# Patient Record
Sex: MALE | Race: WHITE | NOT HISPANIC OR LATINO | Employment: FULL TIME | URBAN - METROPOLITAN AREA
[De-identification: names, ages, dates, MRNs, and addresses within clinical notes are randomized per-mention and may not be internally consistent; named-entity substitution may affect disease eponyms.]

---

## 2019-09-24 ENCOUNTER — OFFICE VISIT (OUTPATIENT)
Dept: GASTROENTEROLOGY | Facility: CLINIC | Age: 64
End: 2019-09-24

## 2019-09-24 VITALS
DIASTOLIC BLOOD PRESSURE: 90 MMHG | SYSTOLIC BLOOD PRESSURE: 146 MMHG | BODY MASS INDEX: 30.16 KG/M2 | WEIGHT: 199 LBS | HEIGHT: 68 IN | TEMPERATURE: 98.2 F

## 2019-09-24 DIAGNOSIS — K59.1 FUNCTIONAL DIARRHEA: Primary | ICD-10-CM

## 2019-09-24 DIAGNOSIS — R14.0 ABDOMINAL BLOATING: ICD-10-CM

## 2019-09-24 DIAGNOSIS — R10.9 ABDOMINAL CRAMPING: ICD-10-CM

## 2019-09-24 PROCEDURE — 99204 OFFICE O/P NEW MOD 45 MIN: CPT | Performed by: INTERNAL MEDICINE

## 2019-09-24 RX ORDER — DIPHENHYDRAMINE HCL 25 MG
25 TABLET ORAL EVERY 6 HOURS PRN
COMMUNITY

## 2019-09-24 RX ORDER — HYOSCYAMINE SULFATE 0.125 MG
0.12 TABLET ORAL EVERY 6 HOURS PRN
Qty: 90 TABLET | Refills: 1 | Status: SHIPPED | OUTPATIENT
Start: 2019-09-24 | End: 2019-12-10 | Stop reason: SDUPTHER

## 2019-09-24 RX ORDER — ATORVASTATIN CALCIUM 80 MG/1
80 TABLET, FILM COATED ORAL DAILY
COMMUNITY

## 2019-09-24 NOTE — PROGRESS NOTES
Chief Complaint   Patient presents with   • Diarrhea   • Bloated   • Abdominal Cramping       Subjective     HPI    Chance Pepper is a 64 y.o. male with a past medical history noted below who presents for evaluation of diarrhea, bloating, and abdominal pain.  Symptoms have been present for years with worsening since January.  No associated weight loss.  At his baseline he describes 4-5 bowel movements daily.  This is increased to about 6-7 and a day.  He describes that they are spread out throughout the day with about half being in the morning and half being later on in the day.  There is associated abdominal bloating that gets worse as the day goes on.  He does have associated abdominal cramping.  He does take pepto occasionally which helps a little bit.    Bright red blood with wiping on occasion.    No family hx of GI malignancies.      He has had a hernia repair.      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    Last colonoscopy was about 3.5 years ago-- he thinks this was with Dr Gibson-- reportedly normal.  He had an EGD and was diagnosed with H pylori.  He completed treatment and reports a test showing cure.  He does not think that random colon biopsies were done at that time due to him not having his diarrhea symptoms at that time.      Past Medical History:   Diagnosis Date   • Heart disease          Current Outpatient Medications:   •  aspirin 81 MG EC tablet, Take 81 mg by mouth daily., Disp: , Rfl:   •  atorvastatin (LIPITOR) 80 MG tablet, Take 80 mg by mouth Daily., Disp: , Rfl:   •  Coenzyme Q10 (COQ10 PO), Take 1 capsule by mouth Daily., Disp: , Rfl:   •  diphenhydrAMINE (BENADRYL) 25 MG tablet, Take 25 mg by mouth Every 6 (Six) Hours As Needed for Itching., Disp: , Rfl:   •  fluticasone (FLONASE) 50 MCG/ACT nasal spray, 2 sprays into each nostril daily. Administer 2 sprays in each nostril for each dose., Disp: , Rfl:   •  metoprolol tartrate (LOPRESSOR) 25 MG tablet, Take 25 mg by mouth  2 (two) times a day., Disp: , Rfl:   •  nitroglycerin (NITROSTAT) 0.4 MG SL tablet, Place 0.4 mg under the tongue every 5 (five) minutes as needed for chest pain. Take no more than 3 doses in 15 minutes., Disp: , Rfl:   •  tadalafil (CIALIS) 5 MG tablet, Take 5 mg by mouth daily as needed for erectile dysfunction., Disp: , Rfl:   •  zolpidem (AMBIEN) 10 MG tablet, Take 10 mg by mouth at night as needed for sleep., Disp: , Rfl:   •  hyoscyamine (ANASPAZ,LEVSIN) 0.125 MG tablet, Take 1 tablet by mouth Every 6 (Six) Hours As Needed for Cramping or Diarrhea., Disp: 90 tablet, Rfl: 1  •  pantoprazole (PROTONIX) 20 MG EC tablet, Take 20 mg by mouth daily., Disp: , Rfl:   •  rosuvastatin (CRESTOR) 20 MG tablet, Take 20 mg by mouth daily., Disp: , Rfl:     Allergies   Allergen Reactions   • Amoxicillin    • Clarithromycin    • Hydrocodone    • Lortab [Hydrocodone-Acetaminophen]        Social History     Socioeconomic History   • Marital status: Unknown     Spouse name: Not on file   • Number of children: Not on file   • Years of education: Not on file   • Highest education level: Not on file   Tobacco Use   • Smoking status: Current Some Day Smoker     Years: 2.00     Types: Pipe   • Smokeless tobacco: Never Used   Substance and Sexual Activity   • Alcohol use: Yes     Comment: 2 DRINKS PER WEEK   • Drug use: No   • Sexual activity: Defer       Family History   Problem Relation Age of Onset   • Heart disease Father    • Cancer Sister         breast   • COPD Sister        Review of Systems   Constitutional: Negative for activity change, appetite change and fatigue.   HENT: Negative for sore throat and trouble swallowing.    Respiratory: Negative.    Cardiovascular: Negative.    Gastrointestinal: Positive for abdominal distention and diarrhea. Negative for abdominal pain and blood in stool.        +cramping   Endocrine: Negative for cold intolerance and heat intolerance.   Genitourinary: Negative for difficulty urinating,  dysuria and frequency.   Musculoskeletal: Negative for arthralgias, back pain and myalgias.   Skin: Negative.    Hematological: Negative for adenopathy. Does not bruise/bleed easily.   All other systems reviewed and are negative.      Objective     Vitals:    09/24/19 1336   BP: 146/90   Temp: 98.2 °F (36.8 °C)         09/24/19  1336   Weight: 90.3 kg (199 lb)     Body mass index is 30.26 kg/m².    Physical Exam   Constitutional: He is oriented to person, place, and time. He appears well-developed and well-nourished. No distress.   HENT:   Head: Normocephalic and atraumatic.   Right Ear: External ear normal.   Left Ear: External ear normal.   Nose: Nose normal.   Mouth/Throat: Oropharynx is clear and moist.   Eyes: Conjunctivae and EOM are normal. Right eye exhibits no discharge. Left eye exhibits no discharge. No scleral icterus.   Neck: Normal range of motion. Neck supple. No thyromegaly present.   No supraclavicular adenopathy   Cardiovascular: Normal rate, regular rhythm, normal heart sounds and intact distal pulses. Exam reveals no gallop.   No murmur heard.  No lower extremity edema   Pulmonary/Chest: Effort normal and breath sounds normal. No respiratory distress. He has no wheezes.   Abdominal: Soft. Normal appearance and bowel sounds are normal. He exhibits no distension and no mass. There is no hepatosplenomegaly. There is no tenderness. There is no rigidity, no rebound and no guarding. No hernia.   Genitourinary:   Genitourinary Comments: Rectal exam deferred   Musculoskeletal: Normal range of motion. He exhibits no edema or tenderness.   No atrophy of upper or lower extremities.  Normal digits and nails of both hands.   Lymphadenopathy:     He has no cervical adenopathy.   Neurological: He is alert and oriented to person, place, and time. He displays no atrophy. Coordination normal.   Skin: Skin is warm and dry. No rash noted. He is not diaphoretic. No erythema.   Psychiatric: He has a normal mood and  affect. His behavior is normal. Judgment and thought content normal.   Vitals reviewed.      No results found for: WBC, RBC, HGB, HCT, MCV, MCH, MCHC, RDW, RDWSD, MPV, PLT, NEUTRORELPCT, LYMPHORELPCT, MONORELPCT, EOSRELPCT, BASORELPCT, AUTOIGPER, NEUTROABS, LYMPHSABS, MONOSABS, EOSABS, BASOSABS, AUTOIGNUM, NRBC    No results found for: GLUCOSE, NA, K, CO2, CL, ANIONGAP, CREATININE, BUN, BCR, CALCIUM, EGFRIFNONA, ALKPHOS, PROTEINTOT, ALT, AST, BILITOT, ALBUMIN, GLOB, LABIL2      Imaging Results (last 7 days)     ** No results found for the last 168 hours. **            No notes on file    Assessment/Plan    Diarrhea: Chronic issue with worsening over the past 8 to 9 months.?  IBS versus medication versus other    Abdominal cramping: With above    Abdominal bloating: With above    Plan  We will check celiac labs today  Trial of hyoscyamine for symptoms  We will get records from his past colonoscopy and EGD from Dr. Gibson's office  We will get his recent labs from his primary care physician  If not improving, then will look to repeat colonoscopy with random colon biopsies    Chance was seen today for diarrhea, bloated and abdominal cramping.    Diagnoses and all orders for this visit:    Functional diarrhea  -     IgA  -     Tissue Transglutaminase, IgA  -     Tissue Transglutaminase, IgG    Abdominal cramping  -     IgA  -     Tissue Transglutaminase, IgA  -     Tissue Transglutaminase, IgG    Abdominal bloating  -     IgA  -     Tissue Transglutaminase, IgA  -     Tissue Transglutaminase, IgG    Other orders  -     hyoscyamine (ANASPAZ,LEVSIN) 0.125 MG tablet; Take 1 tablet by mouth Every 6 (Six) Hours As Needed for Cramping or Diarrhea.        I have discussed the above plan with the patient.  They verbalize understanding and are in agreement with the plan.  They have been advised to contact the office for any questions, concerns, or changes related to their health.    Dictated utilizing Dragon dictation

## 2019-09-24 NOTE — PATIENT INSTRUCTIONS
Labs today    Try the levsin for cramps, diarrhea    Will get Dr Gibson's records    If diarrhea continues, will need repeat colonoscopy

## 2019-09-25 ENCOUNTER — TELEPHONE (OUTPATIENT)
Dept: GASTROENTEROLOGY | Facility: CLINIC | Age: 64
End: 2019-09-25

## 2019-09-25 LAB
IGA SERPL-MCNC: 174 MG/DL (ref 61–437)
TTG IGA SER-ACNC: <2 U/ML (ref 0–3)
TTG IGG SER-ACNC: <2 U/ML (ref 0–5)

## 2019-09-25 NOTE — TELEPHONE ENCOUNTER
Called Dr Gibson's office at [phone] and spoke with Fabiana and requested pt's endoscopy and path report be faxed to [phone].

## 2019-09-25 NOTE — TELEPHONE ENCOUNTER
----- Message from Zee Aquino MD sent at 9/24/2019  5:02 PM EDT -----  He had endoscopy 2015 with Arthur-- can we pls get records, thx

## 2019-09-27 ENCOUNTER — TELEPHONE (OUTPATIENT)
Dept: GASTROENTEROLOGY | Facility: CLINIC | Age: 64
End: 2019-09-27

## 2019-09-27 NOTE — TELEPHONE ENCOUNTER
VM to home # - identifies as Eddi and Xiomy.  Advise per Dr Aquino that celiac testing is normal.  Contact office if any questions

## 2019-11-22 ENCOUNTER — OFFICE VISIT (OUTPATIENT)
Dept: GASTROENTEROLOGY | Facility: CLINIC | Age: 64
End: 2019-11-22

## 2019-11-22 VITALS
SYSTOLIC BLOOD PRESSURE: 124 MMHG | BODY MASS INDEX: 30.31 KG/M2 | DIASTOLIC BLOOD PRESSURE: 82 MMHG | WEIGHT: 200 LBS | HEIGHT: 68 IN | TEMPERATURE: 97.9 F

## 2019-11-22 DIAGNOSIS — K57.90 DIVERTICULOSIS: ICD-10-CM

## 2019-11-22 DIAGNOSIS — K58.0 IRRITABLE BOWEL SYNDROME WITH DIARRHEA: Primary | ICD-10-CM

## 2019-11-22 DIAGNOSIS — R10.30 LOWER ABDOMINAL PAIN: ICD-10-CM

## 2019-11-22 DIAGNOSIS — Z87.19 HISTORY OF CHRONIC GASTRITIS: ICD-10-CM

## 2019-11-22 PROCEDURE — 99214 OFFICE O/P EST MOD 30 MIN: CPT | Performed by: NURSE PRACTITIONER

## 2019-11-22 RX ORDER — LANSOPRAZOLE 30 MG/1
30 CAPSULE, DELAYED RELEASE ORAL DAILY
Qty: 30 CAPSULE | Refills: 11 | Status: SHIPPED | OUTPATIENT
Start: 2019-11-22

## 2019-11-22 RX ORDER — TAMSULOSIN HYDROCHLORIDE 0.4 MG/1
CAPSULE ORAL
Refills: 3 | COMMUNITY
Start: 2019-11-05

## 2019-11-22 RX ORDER — LISINOPRIL 5 MG/1
TABLET ORAL DAILY
Refills: 11 | COMMUNITY
Start: 2019-10-30

## 2019-11-22 NOTE — PROGRESS NOTES
Chief Complaint   Patient presents with   • Follow-up   • Abdominal Pain   • Bloated       Chance Pepper is a  64 y.o. male here for a follow up visit for diarrhea.    HPI  64-year-old male presents today for follow-up visit for diarrhea, abdominal pain, abdominal cramping and GERD.  He is a patient of Dr. Aquino.  He was last seen in the office on 9/24/2019.  He is happy to report that the Levsin has helped with the abdominal pain and cramping but has not touched the diarrhea.  He tells me he has multiple loose stools a day.  Tells me it does seem to be worse when he is at work.  He also has a history of GERD/gastritis and admits to Protonix 20 mill grams once daily did nothing to help his reflux symptoms.  He would like to try something else.  His last EGD and colonoscopy was done on 12/2014 with Dr. Fernández.  Colonoscopy showed diverticulosis.  EGD showed chronic gastritis.  Past were negative.  Patient denies any dysphagia, nausea and vomiting, constipation, rectal bleeding or melena.  He admits his appetite is good and his weight is stable.  He did have labs done that were all unremarkable including a negative celiac panel.  Past Medical History:   Diagnosis Date   • Heart disease        Past Surgical History:   Procedure Laterality Date   • CAROTID STENT  02/15/2007   • COLONOSCOPY  12/2015    normal per patient   • FINGER SURGERY  07/15/2008   • FINGER/THUMB ARTHROPLASTY Left 03/10/2010   • HERNIA REPAIR     • KNEE SURGERY  1978   • SHOULDER SURGERY Right 09/24/2007    PARTIAL TEAR   • UPPER GASTROINTESTINAL ENDOSCOPY  12/2015    normal per patient   • VASECTOMY  1984       Scheduled Meds:    Continuous Infusions:  No current facility-administered medications for this visit.     PRN Meds:.    Allergies   Allergen Reactions   • Amoxicillin Rash   • Clarithromycin Rash   • Hydrocodone Rash   • Lortab [Hydrocodone-Acetaminophen] Rash       Social History     Socioeconomic History   • Marital status: Unknown      Spouse name: Not on file   • Number of children: Not on file   • Years of education: Not on file   • Highest education level: Not on file   Tobacco Use   • Smoking status: Current Some Day Smoker     Years: 2.00     Types: Pipe   • Smokeless tobacco: Never Used   Substance and Sexual Activity   • Alcohol use: Yes     Comment: 2 DRINKS PER WEEK   • Drug use: No   • Sexual activity: Defer       Family History   Problem Relation Age of Onset   • Heart disease Father    • Cancer Sister         breast   • COPD Sister        Review of Systems   Constitutional: Negative for appetite change, chills, diaphoresis, fatigue, fever and unexpected weight change.   HENT: Negative for nosebleeds, postnasal drip, sore throat, trouble swallowing and voice change.    Respiratory: Negative for cough, choking, chest tightness, shortness of breath and wheezing.    Cardiovascular: Negative for chest pain, palpitations and leg swelling.   Gastrointestinal: Positive for abdominal distention and diarrhea. Negative for abdominal pain, anal bleeding, blood in stool, constipation, nausea, rectal pain and vomiting.   Endocrine: Negative for polydipsia, polyphagia and polyuria.   Musculoskeletal: Negative for gait problem.   Skin: Negative for rash and wound.   Allergic/Immunologic: Negative for food allergies.   Neurological: Negative for dizziness, speech difficulty and light-headedness.   Psychiatric/Behavioral: Negative for confusion, self-injury, sleep disturbance and suicidal ideas.       Vitals:    11/22/19 1014   BP: 124/82   Temp: 97.9 °F (36.6 °C)       Physical Exam   Constitutional: He is oriented to person, place, and time. He appears well-developed and well-nourished. He does not appear ill. No distress.   HENT:   Head: Normocephalic.   Eyes: Pupils are equal, round, and reactive to light.   Cardiovascular: Normal rate, regular rhythm and normal heart sounds.   Pulmonary/Chest: Effort normal and breath sounds normal.   Abdominal:  Soft. Bowel sounds are normal. He exhibits distension. He exhibits no mass. There is no hepatosplenomegaly. There is no tenderness. There is no rebound and no guarding. No hernia.   Musculoskeletal: Normal range of motion.   Neurological: He is alert and oriented to person, place, and time.   Skin: Skin is warm and dry.   Psychiatric: He has a normal mood and affect. His speech is normal and behavior is normal. Judgment normal.       No images are attached to the encounter.    Assessment and plan     1. Irritable bowel syndrome with diarrhea  - Case Request; Standing  - Case Request    2. Lower abdominal pain  - Case Request; Standing  - Case Request    3. Diverticulosis  - Case Request; Standing  - Case Request    4. History of chronic gastritis  - Case Request; Standing  - Case Request    Reviewed labs with him today.  Labs were unremarkable including a negative celiac panel.  Given his history and continued symptoms recommend an EGD and colonoscopy with Dr. Aquino for further evaluation.  Patient is agreeable to the scopes.  We will go ahead and change the Protonix to Prevacid 30 mill grams once daily and see how he does on that.  Continue GERD precautions.  Patient to call the office next week with an update.

## 2019-12-11 RX ORDER — HYOSCYAMINE SULFATE 0.125 MG
TABLET ORAL
Qty: 90 TABLET | Refills: 0 | Status: SHIPPED | OUTPATIENT
Start: 2019-12-11 | End: 2020-01-31 | Stop reason: SDUPTHER

## 2020-01-24 ENCOUNTER — HOSPITAL ENCOUNTER (OUTPATIENT)
Facility: HOSPITAL | Age: 65
Setting detail: HOSPITAL OUTPATIENT SURGERY
Discharge: HOME | End: 2020-01-24
Attending: INTERNAL MEDICINE | Admitting: INTERNAL MEDICINE

## 2020-01-24 ENCOUNTER — ANESTHESIA (OUTPATIENT)
Dept: GASTROENTEROLOGY | Facility: HOSPITAL | Age: 65
End: 2020-01-24

## 2020-01-24 ENCOUNTER — ANESTHESIA EVENT (OUTPATIENT)
Dept: GASTROENTEROLOGY | Facility: HOSPITAL | Age: 65
End: 2020-01-24

## 2020-01-24 VITALS
RESPIRATION RATE: 16 BRPM | DIASTOLIC BLOOD PRESSURE: 74 MMHG | HEIGHT: 68 IN | SYSTOLIC BLOOD PRESSURE: 105 MMHG | HEART RATE: 68 BPM | BODY MASS INDEX: 30.54 KG/M2 | TEMPERATURE: 98.5 F | WEIGHT: 201.5 LBS | OXYGEN SATURATION: 96 %

## 2020-01-24 DIAGNOSIS — K57.90 DIVERTICULOSIS: ICD-10-CM

## 2020-01-24 DIAGNOSIS — K58.0 IRRITABLE BOWEL SYNDROME WITH DIARRHEA: ICD-10-CM

## 2020-01-24 DIAGNOSIS — Z87.19 HISTORY OF CHRONIC GASTRITIS: ICD-10-CM

## 2020-01-24 DIAGNOSIS — R10.30 LOWER ABDOMINAL PAIN: ICD-10-CM

## 2020-01-24 PROCEDURE — S0260 H&P FOR SURGERY: HCPCS | Performed by: INTERNAL MEDICINE

## 2020-01-24 PROCEDURE — 43239 EGD BIOPSY SINGLE/MULTIPLE: CPT | Performed by: INTERNAL MEDICINE

## 2020-01-24 PROCEDURE — 88305 TISSUE EXAM BY PATHOLOGIST: CPT | Performed by: INTERNAL MEDICINE

## 2020-01-24 PROCEDURE — 45380 COLONOSCOPY AND BIOPSY: CPT | Performed by: INTERNAL MEDICINE

## 2020-01-24 PROCEDURE — 25010000002 PROPOFOL 10 MG/ML EMULSION: Performed by: ANESTHESIOLOGY

## 2020-01-24 RX ORDER — PROPOFOL 10 MG/ML
VIAL (ML) INTRAVENOUS AS NEEDED
Status: DISCONTINUED | OUTPATIENT
Start: 2020-01-24 | End: 2020-01-24 | Stop reason: SURG

## 2020-01-24 RX ORDER — LIDOCAINE HYDROCHLORIDE 10 MG/ML
0.5 INJECTION, SOLUTION INFILTRATION; PERINEURAL ONCE AS NEEDED
Status: DISCONTINUED | OUTPATIENT
Start: 2020-01-24 | End: 2020-01-24 | Stop reason: HOSPADM

## 2020-01-24 RX ORDER — SODIUM CHLORIDE 0.9 % (FLUSH) 0.9 %
10 SYRINGE (ML) INJECTION AS NEEDED
Status: DISCONTINUED | OUTPATIENT
Start: 2020-01-24 | End: 2020-01-24 | Stop reason: HOSPADM

## 2020-01-24 RX ORDER — PROPOFOL 10 MG/ML
VIAL (ML) INTRAVENOUS CONTINUOUS PRN
Status: DISCONTINUED | OUTPATIENT
Start: 2020-01-24 | End: 2020-01-24 | Stop reason: SURG

## 2020-01-24 RX ORDER — SODIUM CHLORIDE, SODIUM LACTATE, POTASSIUM CHLORIDE, CALCIUM CHLORIDE 600; 310; 30; 20 MG/100ML; MG/100ML; MG/100ML; MG/100ML
1000 INJECTION, SOLUTION INTRAVENOUS CONTINUOUS
Status: DISCONTINUED | OUTPATIENT
Start: 2020-01-24 | End: 2020-01-24 | Stop reason: HOSPADM

## 2020-01-24 RX ORDER — LIDOCAINE HYDROCHLORIDE 20 MG/ML
INJECTION, SOLUTION INFILTRATION; PERINEURAL AS NEEDED
Status: DISCONTINUED | OUTPATIENT
Start: 2020-01-24 | End: 2020-01-24 | Stop reason: SURG

## 2020-01-24 RX ADMIN — PROPOFOL 100 MG: 10 INJECTION, EMULSION INTRAVENOUS at 08:44

## 2020-01-24 RX ADMIN — LIDOCAINE HYDROCHLORIDE 60 MG: 20 INJECTION, SOLUTION INFILTRATION; PERINEURAL at 08:38

## 2020-01-24 RX ADMIN — SODIUM CHLORIDE, POTASSIUM CHLORIDE, SODIUM LACTATE AND CALCIUM CHLORIDE 1000 ML: 600; 310; 30; 20 INJECTION, SOLUTION INTRAVENOUS at 07:45

## 2020-01-24 RX ADMIN — PROPOFOL 100 MCG/KG/MIN: 10 INJECTION, EMULSION INTRAVENOUS at 08:39

## 2020-01-24 RX ADMIN — PROPOFOL 100 MG: 10 INJECTION, EMULSION INTRAVENOUS at 08:38

## 2020-01-24 NOTE — ANESTHESIA PREPROCEDURE EVALUATION
Anesthesia Evaluation     Patient summary reviewed and Nursing notes reviewed                Airway   Mallampati: I  TM distance: >3 FB  Neck ROM: full  No difficulty expected  Dental - normal exam     Pulmonary - normal exam   (+) a smoker Former,   Cardiovascular - normal exam    (+) cardiac stents more than 12 months ago       Neuro/Psych- negative ROS  GI/Hepatic/Renal/Endo - negative ROS     Musculoskeletal (-) negative ROS    Abdominal  - normal exam    Bowel sounds: normal.   Substance History - negative use     OB/GYN negative ob/gyn ROS         Other                      Anesthesia Plan    ASA 3     MAC       Anesthetic plan, all risks, benefits, and alternatives have been provided, discussed and informed consent has been obtained with: patient.

## 2020-01-24 NOTE — H&P
[facility]  Pre Procedure History & Physical    Chief Complaint: Refractory diarrhea, GERD      HPI: 64-year-old male presents today for follow-up visit for diarrhea, abdominal pain, abdominal cramping and GERD.  He is a patient of Dr. Aquino.  He was last seen in the office on 9/24/2019.  He is happy to report that the Levsin has helped with the abdominal pain and cramping but has not touched the diarrhea.  He tells me he has multiple loose stools a day.  Tells me it does seem to be worse when he is at work.  He also has a history of GERD/gastritis and admits to Protonix 20 mill grams once daily did nothing to help his reflux symptoms.  He would like to try something else.  His last EGD and colonoscopy was done on 12/2014 with Dr. Fernández.  Colonoscopy showed diverticulosis.  EGD showed chronic gastritis.  Past were negative.  Patient denies any dysphagia, nausea and vomiting, constipation, rectal bleeding or melena.  He admits his appetite is good and his weight is stable.  He did have labs done that were all unremarkable including a negative celiac panel.    Past Medical History:   Past Medical History:   Diagnosis Date   • Heart disease        Family History:  Family History   Problem Relation Age of Onset   • Heart disease Father    • Cancer Sister         breast   • COPD Sister        Social History:   reports that he has been smoking pipe. He has smoked for the past 2.00 years. He has never used smokeless tobacco. He reports that he drinks alcohol. He reports that he does not use drugs.    Medications:   Medications Prior to Admission   Medication Sig Dispense Refill Last Dose   • atorvastatin (LIPITOR) 80 MG tablet Take 80 mg by mouth Daily.   1/24/2020 at Unknown time   • fluticasone (FLONASE) 50 MCG/ACT nasal spray 2 sprays into each nostril daily. Administer 2 sprays in each nostril for each dose.   Past Month at Unknown time   • lisinopril (PRINIVIL,ZESTRIL) 5 MG tablet Take  by  "mouth Daily.  11 1/24/2020 at Unknown time   • aspirin 81 MG EC tablet Take 81 mg by mouth daily.   1/17/2020   • Coenzyme Q10 (COQ10 PO) Take 1 capsule by mouth Daily.   1/22/2020   • diphenhydrAMINE (BENADRYL) 25 MG tablet Take 25 mg by mouth Every 6 (Six) Hours As Needed for Itching.   1/22/2020   • hyoscyamine (ANASPAZ,LEVSIN) 0.125 MG tablet TAKE 1 TABLET BY MOUTH EVERY 6 HOURS AS NEEDED FOR CRAMPING OR DIARRHEA 90 tablet 0 1/22/2020   • lansoprazole (PREVACID) 30 MG capsule Take 1 capsule by mouth Daily. 30 capsule 11 More than a month at Unknown time   • metoprolol tartrate (LOPRESSOR) 25 MG tablet Take 25 mg by mouth 2 (two) times a day.   1/22/2020   • nitroglycerin (NITROSTAT) 0.4 MG SL tablet Place 0.4 mg under the tongue every 5 (five) minutes as needed for chest pain. Take no more than 3 doses in 15 minutes.   More than a month at Unknown time   • rosuvastatin (CRESTOR) 20 MG tablet Take 20 mg by mouth daily.   Not Taking   • tadalafil (CIALIS) 5 MG tablet Take 5 mg by mouth daily as needed for erectile dysfunction.   1/22/2020   • tamsulosin (FLOMAX) 0.4 MG capsule 24 hr capsule TK 1 C PO QD  3 Taking   • zolpidem (AMBIEN) 10 MG tablet Take 10 mg by mouth at night as needed for sleep.   1/23/2020       Allergies:  Amoxicillin; Clarithromycin; Hydrocodone; and Lortab [hydrocodone-acetaminophen]    ROS:    Pertinent items are noted in HPI     Objective     Blood pressure 130/86, pulse 85, temperature 98.5 °F (36.9 °C), temperature source Oral, resp. rate 16, height 172.7 cm (68\"), weight 91.4 kg (201 lb 8 oz), SpO2 93 %.    Physical Exam   Constitutional: Pt is oriented to person, place, and time and well-developed, well-nourished, and in no distress.   HENT:   Mouth/Throat: Oropharynx is clear and moist.   Neck: Normal range of motion. Neck supple.   Cardiovascular: Normal rate, regular rhythm and normal heart sounds.    Pulmonary/Chest: Effort normal and breath sounds normal. No respiratory distress. " No  wheezes.   Abdominal: Soft. Bowel sounds are normal.   Skin: Skin is warm and dry.   Psychiatric: Mood, memory, affect and judgment normal.     Assessment/Plan     Diagnosis: Refractory diarrhea, GERD      Anticipated Surgical Procedure:  EGD  Colonoscopy    The risks, benefits, and alternatives of this procedure have been discussed with the patient or the responsible party- the patient understands and agrees to proceed.

## 2020-01-27 LAB
CYTO UR: NORMAL
LAB AP CASE REPORT: NORMAL
PATH REPORT.FINAL DX SPEC: NORMAL
PATH REPORT.GROSS SPEC: NORMAL

## 2020-01-29 ENCOUNTER — TELEPHONE (OUTPATIENT)
Dept: GASTROENTEROLOGY | Facility: CLINIC | Age: 65
End: 2020-01-29

## 2020-01-29 NOTE — TELEPHONE ENCOUNTER
----- Message from Zee Aquino MD sent at 1/29/2020 12:44 PM EST -----  Biopsies from the stomach body show very mild inflammation.  He should continue PPI    The colon polyp removed was a tubular adenoma    The random colon biopsies showed normal tissue, no evidence of microscopic colitis    Please place in 5-year colonoscopy recall    Continue hyoscyamine, can use over-the-counter Imodium as well to control diarrhea symptoms    Office follow-up in about 3 months

## 2020-01-29 NOTE — PROGRESS NOTES
Biopsies from the stomach body show very mild inflammation.  He should continue PPI    The colon polyp removed was a tubular adenoma    The random colon biopsies showed normal tissue, no evidence of microscopic colitis    Please place in 5-year colonoscopy recall    Continue hyoscyamine, can use over-the-counter Imodium as well to control diarrhea symptoms    Office follow-up in about 3 months

## 2020-01-31 RX ORDER — HYOSCYAMINE SULFATE 0.125 MG
0.12 TABLET ORAL EVERY 6 HOURS PRN
Qty: 90 TABLET | Refills: 0 | Status: SHIPPED | OUTPATIENT
Start: 2020-01-31 | End: 2020-03-21 | Stop reason: SDUPTHER

## 2020-01-31 NOTE — TELEPHONE ENCOUNTER
Returned pt's call and advised per Dr Aquino that the bx from the stomach body show very mild inflammation.  He should continue ppi.      The colon polyp removed was a precancerous tubular adenoma.      The random colon bx showed normal tissue, no evidence of microscopic colitis.      She recommends a repeat c/s in 5yrs.  Continue hyoscyamine and can use otc imodium as well to control diarrhea symtpoms.      ADvised to f/u in 3 mo.     Pt verb understanding and made appt for 04/24 at 1030a with Jessica FELIZ..  Also pt advised he needs a script for hyoscyamine.  Advised will send this to his pharmacy. Pt verb understanding.

## 2020-03-27 RX ORDER — HYOSCYAMINE SULFATE 0.125 MG
0.12 TABLET ORAL EVERY 6 HOURS PRN
Qty: 90 TABLET | Refills: 0 | Status: SHIPPED | OUTPATIENT
Start: 2020-03-27

## 2020-09-22 ENCOUNTER — OFFICE (OUTPATIENT)
Dept: URBAN - METROPOLITAN AREA CLINIC 66 | Facility: CLINIC | Age: 65
End: 2020-09-22

## 2020-09-22 VITALS
SYSTOLIC BLOOD PRESSURE: 108 MMHG | DIASTOLIC BLOOD PRESSURE: 66 MMHG | WEIGHT: 200 LBS | TEMPERATURE: 97.4 F | HEIGHT: 68 IN | HEART RATE: 76 BPM

## 2020-09-22 DIAGNOSIS — K62.5 HEMORRHAGE OF ANUS AND RECTUM: ICD-10-CM

## 2020-09-22 DIAGNOSIS — R19.7 DIARRHEA, UNSPECIFIED: ICD-10-CM

## 2020-09-22 DIAGNOSIS — K64.8 OTHER HEMORRHOIDS: ICD-10-CM

## 2020-09-22 PROCEDURE — 99203 OFFICE O/P NEW LOW 30 MIN: CPT | Performed by: NURSE PRACTITIONER

## 2020-09-22 RX ORDER — COLESTIPOL HYDROCHLORIDE 1 G/1
TABLET, FILM COATED ORAL
Qty: 60 | Refills: 11 | Status: COMPLETED
Start: 2020-09-22 | End: 2020-12-07

## 2020-12-07 ENCOUNTER — OFFICE (OUTPATIENT)
Dept: URBAN - METROPOLITAN AREA CLINIC 66 | Facility: CLINIC | Age: 65
End: 2020-12-07

## 2020-12-07 VITALS
HEIGHT: 68 IN | TEMPERATURE: 98.4 F | SYSTOLIC BLOOD PRESSURE: 122 MMHG | HEART RATE: 77 BPM | WEIGHT: 207 LBS | DIASTOLIC BLOOD PRESSURE: 81 MMHG

## 2020-12-07 DIAGNOSIS — R19.7 DIARRHEA, UNSPECIFIED: ICD-10-CM

## 2020-12-07 DIAGNOSIS — R10.9 UNSPECIFIED ABDOMINAL PAIN: ICD-10-CM

## 2020-12-07 DIAGNOSIS — K21.9 GASTRO-ESOPHAGEAL REFLUX DISEASE WITHOUT ESOPHAGITIS: ICD-10-CM

## 2020-12-07 PROCEDURE — 99213 OFFICE O/P EST LOW 20 MIN: CPT | Performed by: NURSE PRACTITIONER

## 2020-12-07 RX ORDER — OMEPRAZOLE 20 MG/1
CAPSULE, DELAYED RELEASE ORAL
Qty: 90 | Refills: 4 | Status: ACTIVE

## 2020-12-07 RX ORDER — HYOSCYAMINE SULFATE 0.12 MG/1
0.5 TABLET ORAL; SUBLINGUAL
Qty: 60 | Refills: 5 | Status: ACTIVE
Start: 2020-12-07

## 2020-12-07 RX ORDER — COLESTIPOL HYDROCHLORIDE 1 G/1
TABLET, FILM COATED ORAL
Qty: 60 | Refills: 11 | Status: COMPLETED
Start: 2020-09-22 | End: 2020-12-07

## 2020-12-07 RX ORDER — COLESEVELAM HYDROCHLORIDE 625 MG/1
1250 TABLET, FILM COATED ORAL
Qty: 60 | Refills: 11 | Status: COMPLETED
Start: 2020-12-07 | End: 2021-03-08 | Stop reason: ALTCHOICE

## 2021-03-08 ENCOUNTER — OFFICE (OUTPATIENT)
Dept: URBAN - METROPOLITAN AREA CLINIC 66 | Facility: CLINIC | Age: 66
End: 2021-03-08

## 2021-03-08 VITALS
DIASTOLIC BLOOD PRESSURE: 73 MMHG | HEART RATE: 86 BPM | TEMPERATURE: 98.2 F | SYSTOLIC BLOOD PRESSURE: 111 MMHG | WEIGHT: 210 LBS | HEIGHT: 68 IN

## 2021-03-08 DIAGNOSIS — K58.0 IRRITABLE BOWEL SYNDROME WITH DIARRHEA: ICD-10-CM

## 2021-03-08 DIAGNOSIS — K64.8 OTHER HEMORRHOIDS: ICD-10-CM

## 2021-03-08 PROCEDURE — 99213 OFFICE O/P EST LOW 20 MIN: CPT | Performed by: NURSE PRACTITIONER

## 2021-03-08 RX ORDER — AMITRIPTYLINE HYDROCHLORIDE 10 MG/1
10 TABLET, FILM COATED ORAL
Qty: 30 | Refills: 11 | Status: COMPLETED
Start: 2021-03-08 | End: 2021-05-20

## 2021-03-22 ENCOUNTER — BULK ORDERING (OUTPATIENT)
Dept: CASE MANAGEMENT | Facility: OTHER | Age: 66
End: 2021-03-22

## 2021-03-22 DIAGNOSIS — Z23 IMMUNIZATION DUE: ICD-10-CM

## 2021-05-20 ENCOUNTER — OFFICE (OUTPATIENT)
Dept: URBAN - METROPOLITAN AREA CLINIC 66 | Facility: CLINIC | Age: 66
End: 2021-05-20

## 2021-05-20 VITALS
TEMPERATURE: 97.5 F | HEIGHT: 68 IN | DIASTOLIC BLOOD PRESSURE: 72 MMHG | HEART RATE: 80 BPM | WEIGHT: 201 LBS | SYSTOLIC BLOOD PRESSURE: 134 MMHG

## 2021-05-20 DIAGNOSIS — K58.0 IRRITABLE BOWEL SYNDROME WITH DIARRHEA: ICD-10-CM

## 2021-05-20 PROCEDURE — 99213 OFFICE O/P EST LOW 20 MIN: CPT | Performed by: NURSE PRACTITIONER

## 2021-05-20 RX ORDER — AMITRIPTYLINE HYDROCHLORIDE 10 MG/1
10 TABLET, FILM COATED ORAL
Qty: 30 | Refills: 11 | Status: COMPLETED
Start: 2021-03-08 | End: 2021-05-20

## 2021-05-20 RX ORDER — COLESEVELAM HYDROCHLORIDE 625 MG/1
TABLET, FILM COATED ORAL
Qty: 90 | Refills: 11 | Status: COMPLETED
Start: 2021-05-20 | End: 2022-04-20

## 2021-08-30 ENCOUNTER — OFFICE (OUTPATIENT)
Dept: URBAN - METROPOLITAN AREA CLINIC 66 | Facility: CLINIC | Age: 66
End: 2021-08-30

## 2021-08-30 VITALS
HEART RATE: 66 BPM | SYSTOLIC BLOOD PRESSURE: 123 MMHG | HEIGHT: 68 IN | WEIGHT: 212 LBS | DIASTOLIC BLOOD PRESSURE: 66 MMHG

## 2021-08-30 DIAGNOSIS — K21.9 GASTRO-ESOPHAGEAL REFLUX DISEASE WITHOUT ESOPHAGITIS: ICD-10-CM

## 2021-08-30 DIAGNOSIS — K64.8 OTHER HEMORRHOIDS: ICD-10-CM

## 2021-08-30 DIAGNOSIS — K58.0 IRRITABLE BOWEL SYNDROME WITH DIARRHEA: ICD-10-CM

## 2021-08-30 PROCEDURE — 99214 OFFICE O/P EST MOD 30 MIN: CPT | Performed by: NURSE PRACTITIONER

## 2021-08-30 RX ORDER — OMEPRAZOLE 20 MG/1
CAPSULE, DELAYED RELEASE ORAL
Qty: 90 | Refills: 4 | Status: ACTIVE

## 2021-08-30 RX ORDER — HYDROCORTISONE 25 MG/G
5 CREAM TOPICAL
Qty: 1 | Refills: 2 | Status: ACTIVE
Start: 2021-08-30

## 2021-08-30 RX ORDER — AMITRIPTYLINE HYDROCHLORIDE 25 MG/1
25 TABLET, FILM COATED ORAL
Qty: 30 | Refills: 5 | Status: COMPLETED
Start: 2021-08-30 | End: 2021-10-25

## 2021-10-25 ENCOUNTER — OFFICE (OUTPATIENT)
Dept: URBAN - METROPOLITAN AREA CLINIC 66 | Facility: CLINIC | Age: 66
End: 2021-10-25

## 2021-10-25 VITALS
SYSTOLIC BLOOD PRESSURE: 105 MMHG | WEIGHT: 210 LBS | DIASTOLIC BLOOD PRESSURE: 63 MMHG | HEIGHT: 68 IN | HEART RATE: 84 BPM

## 2021-10-25 DIAGNOSIS — K58.0 IRRITABLE BOWEL SYNDROME WITH DIARRHEA: ICD-10-CM

## 2021-10-25 PROCEDURE — 99213 OFFICE O/P EST LOW 20 MIN: CPT | Performed by: NURSE PRACTITIONER

## 2021-10-25 RX ORDER — AMITRIPTYLINE HYDROCHLORIDE 25 MG/1
25 TABLET, FILM COATED ORAL
Qty: 30 | Refills: 5 | Status: COMPLETED
Start: 2021-08-30 | End: 2021-10-25

## 2021-10-25 RX ORDER — AMITRIPTYLINE HYDROCHLORIDE 50 MG/1
50 TABLET, FILM COATED ORAL
Qty: 30 | Refills: 11 | Status: COMPLETED
Start: 2021-10-25 | End: 2022-06-02

## 2022-04-20 ENCOUNTER — OFFICE (OUTPATIENT)
Dept: URBAN - METROPOLITAN AREA CLINIC 66 | Facility: CLINIC | Age: 67
End: 2022-04-20

## 2022-04-20 VITALS
SYSTOLIC BLOOD PRESSURE: 122 MMHG | DIASTOLIC BLOOD PRESSURE: 78 MMHG | HEIGHT: 68 IN | HEART RATE: 94 BPM | WEIGHT: 216 LBS

## 2022-04-20 DIAGNOSIS — K58.0 IRRITABLE BOWEL SYNDROME WITH DIARRHEA: ICD-10-CM

## 2022-04-20 DIAGNOSIS — K21.9 GASTRO-ESOPHAGEAL REFLUX DISEASE WITHOUT ESOPHAGITIS: ICD-10-CM

## 2022-04-20 PROCEDURE — 99214 OFFICE O/P EST MOD 30 MIN: CPT | Performed by: INTERNAL MEDICINE

## 2022-06-02 ENCOUNTER — OFFICE (OUTPATIENT)
Dept: URBAN - METROPOLITAN AREA CLINIC 66 | Facility: CLINIC | Age: 67
End: 2022-06-02

## 2022-06-02 VITALS
WEIGHT: 216 LBS | HEIGHT: 68 IN | SYSTOLIC BLOOD PRESSURE: 129 MMHG | HEART RATE: 96 BPM | DIASTOLIC BLOOD PRESSURE: 73 MMHG

## 2022-06-02 DIAGNOSIS — K21.9 GASTRO-ESOPHAGEAL REFLUX DISEASE WITHOUT ESOPHAGITIS: ICD-10-CM

## 2022-06-02 DIAGNOSIS — K58.0 IRRITABLE BOWEL SYNDROME WITH DIARRHEA: ICD-10-CM

## 2022-06-02 PROCEDURE — 99213 OFFICE O/P EST LOW 20 MIN: CPT | Performed by: NURSE PRACTITIONER

## 2022-06-02 RX ORDER — AMITRIPTYLINE HYDROCHLORIDE 50 MG/1
TABLET, FILM COATED ORAL
Qty: 90 | Refills: 4 | Status: ACTIVE

## 2022-06-02 RX ORDER — OMEPRAZOLE 20 MG/1
CAPSULE, DELAYED RELEASE ORAL
Qty: 90 | Refills: 4 | Status: ACTIVE

## 2022-06-02 RX ORDER — AMITRIPTYLINE HYDROCHLORIDE 50 MG/1
50 TABLET, FILM COATED ORAL
Qty: 30 | Refills: 11 | Status: COMPLETED
Start: 2021-10-25 | End: 2022-06-02

## 2023-03-29 ENCOUNTER — OFFICE (OUTPATIENT)
Dept: URBAN - METROPOLITAN AREA CLINIC 66 | Facility: CLINIC | Age: 68
End: 2023-03-29

## 2023-03-29 VITALS
DIASTOLIC BLOOD PRESSURE: 69 MMHG | WEIGHT: 212 LBS | SYSTOLIC BLOOD PRESSURE: 114 MMHG | HEIGHT: 68 IN | HEART RATE: 85 BPM

## 2023-03-29 DIAGNOSIS — K58.0 IRRITABLE BOWEL SYNDROME WITH DIARRHEA: ICD-10-CM

## 2023-03-29 DIAGNOSIS — K21.9 GASTRO-ESOPHAGEAL REFLUX DISEASE WITHOUT ESOPHAGITIS: ICD-10-CM

## 2023-03-29 PROCEDURE — 99213 OFFICE O/P EST LOW 20 MIN: CPT | Performed by: INTERNAL MEDICINE

## 2023-03-29 RX ORDER — HYDROCORTISONE 25 MG/G
5 CREAM TOPICAL
Qty: 1 | Refills: 2 | Status: ACTIVE
Start: 2021-08-30

## 2023-03-29 RX ORDER — AMITRIPTYLINE HYDROCHLORIDE 50 MG/1
TABLET, FILM COATED ORAL
Qty: 90 | Refills: 4 | Status: ACTIVE

## 2023-03-29 RX ORDER — OMEPRAZOLE 20 MG/1
CAPSULE, DELAYED RELEASE ORAL
Qty: 90 | Refills: 4 | Status: ACTIVE

## 2024-03-27 ENCOUNTER — OFFICE (OUTPATIENT)
Dept: URBAN - METROPOLITAN AREA CLINIC 66 | Facility: CLINIC | Age: 69
End: 2024-03-27

## 2024-03-27 VITALS
HEART RATE: 89 BPM | WEIGHT: 213 LBS | SYSTOLIC BLOOD PRESSURE: 131 MMHG | DIASTOLIC BLOOD PRESSURE: 73 MMHG | HEIGHT: 68 IN

## 2024-03-27 DIAGNOSIS — K58.0 IRRITABLE BOWEL SYNDROME WITH DIARRHEA: ICD-10-CM

## 2024-03-27 DIAGNOSIS — K21.9 GASTRO-ESOPHAGEAL REFLUX DISEASE WITHOUT ESOPHAGITIS: ICD-10-CM

## 2024-03-27 PROCEDURE — 99213 OFFICE O/P EST LOW 20 MIN: CPT | Performed by: INTERNAL MEDICINE

## 2024-03-27 RX ORDER — RIFAXIMIN 550 MG/1
TABLET ORAL
Qty: 42 | Refills: 2 | Status: ACTIVE
Start: 2024-03-27

## 2024-03-27 NOTE — SERVICEHPINOTES
here for followup. staying active.  good appetite, GI system is doing well.   Has had tough day, multiple bowel movements,   had to double atorvastatin and symptoms worsened.  did take fibercon for a a while.  no change in the bowle habs at all.  Has some good days,  ie 4 times yesterday.  inadvertent gluten causes issues.   uses pepto and antidiarrhea as needed.

## 2025-04-08 ENCOUNTER — OFFICE (OUTPATIENT)
Dept: URBAN - METROPOLITAN AREA CLINIC 76 | Facility: CLINIC | Age: 70
End: 2025-04-08
Payer: MEDICARE

## 2025-04-08 VITALS
SYSTOLIC BLOOD PRESSURE: 126 MMHG | HEART RATE: 89 BPM | HEIGHT: 68 IN | DIASTOLIC BLOOD PRESSURE: 78 MMHG | WEIGHT: 198 LBS | OXYGEN SATURATION: 90 %

## 2025-04-08 DIAGNOSIS — K21.9 GASTRO-ESOPHAGEAL REFLUX DISEASE WITHOUT ESOPHAGITIS: ICD-10-CM

## 2025-04-08 DIAGNOSIS — K58.0 IRRITABLE BOWEL SYNDROME WITH DIARRHEA: ICD-10-CM

## 2025-04-08 PROCEDURE — 99214 OFFICE O/P EST MOD 30 MIN: CPT | Performed by: INTERNAL MEDICINE

## 2025-04-08 RX ORDER — RIFAXIMIN 550 MG/1
TABLET ORAL
Qty: 42 | Refills: 2 | Status: ACTIVE
Start: 2025-04-08

## 2025-04-08 RX ORDER — HYDROCORTISONE 25 MG/G
OINTMENT TOPICAL
Qty: 30 | Refills: 6 | Status: ACTIVE
Start: 2025-04-08

## 2025-04-08 RX ORDER — AMITRIPTYLINE HYDROCHLORIDE 50 MG/1
50 TABLET, FILM COATED ORAL
Qty: 30 | Refills: 11 | Status: ACTIVE
Start: 2025-04-08

## 2025-04-08 RX ORDER — DICYCLOMINE HYDROCHLORIDE 20 MG/1
80 TABLET ORAL
Qty: 120 | Refills: 12 | Status: ACTIVE
Start: 2025-04-08

## 2025-04-08 RX ORDER — AMITRIPTYLINE HYDROCHLORIDE 50 MG/1
50 TABLET, FILM COATED ORAL
Qty: 30 | Refills: 11 | Status: CANCELLED
Start: 2025-04-08 | End: 2025-04-08

## (undated) DEVICE — KT ORCA ORCAPOD DISP STRL

## (undated) DEVICE — THE TORRENT IRRIGATION SCOPE CONNECTOR IS USED WITH THE TORRENT IRRIGATION TUBING TO PROVIDE IRRIGATION FLUIDS SUCH AS STERILE WATER DURING GASTROINTESTINAL ENDOSCOPIC PROCEDURES WHEN USED IN CONJUNCTION WITH AN IRRIGATION PUMP (OR ELECTROSURGICAL UNIT).: Brand: TORRENT

## (undated) DEVICE — SENSR O2 OXIMAX FNGR A/ 18IN NONSTR

## (undated) DEVICE — MSK PROC CURAPLEX O2 2/ADAPT 7FT

## (undated) DEVICE — LN SMPL CO2 SHTRM SD STREAM W/M LUER

## (undated) DEVICE — BITEBLOCK OMNI BLOC

## (undated) DEVICE — SINGLE-USE BIOPSY FORCEPS: Brand: RADIAL JAW 4

## (undated) DEVICE — ADAPT CLN BIOGUARD AIR/H2O DISP

## (undated) DEVICE — TUBING, SUCTION, 1/4" X 10', STRAIGHT: Brand: MEDLINE